# Patient Record
Sex: MALE | Race: WHITE | Employment: FULL TIME | URBAN - METROPOLITAN AREA
[De-identification: names, ages, dates, MRNs, and addresses within clinical notes are randomized per-mention and may not be internally consistent; named-entity substitution may affect disease eponyms.]

---

## 2020-09-18 ENCOUNTER — HOSPITAL ENCOUNTER (OUTPATIENT)
Facility: CLINIC | Age: 50
Discharge: HOME | End: 2020-09-20
Payer: COMMERCIAL

## 2020-09-18 ENCOUNTER — HOSPITAL ENCOUNTER (OUTPATIENT)
Dept: GENERAL RADIOLOGY | Facility: CLINIC | Age: 50
Discharge: HOME | End: 2020-09-20
Payer: COMMERCIAL

## 2020-09-18 ENCOUNTER — HOSPITAL ENCOUNTER (OUTPATIENT)
Facility: CLINIC | Age: 50
Discharge: HOME | End: 2020-09-18
Payer: COMMERCIAL

## 2020-09-18 LAB — SARS-COV-2 ANTIBODY, TOTAL: NEGATIVE

## 2020-09-18 PROCEDURE — 86769 SARS-COV-2 COVID-19 ANTIBODY: CPT

## 2020-09-18 PROCEDURE — 73030 X-RAY EXAM OF SHOULDER: CPT

## 2020-09-18 PROCEDURE — 36415 COLL VENOUS BLD VENIPUNCTURE: CPT

## 2021-03-05 DIAGNOSIS — M25.511 RIGHT SHOULDER PAIN, UNSPECIFIED CHRONICITY: Primary | ICD-10-CM

## 2021-03-08 ENCOUNTER — TELEPHONE (OUTPATIENT)
Dept: ORTHOPEDIC SURGERY | Age: 51
End: 2021-03-08

## 2021-03-08 NOTE — TELEPHONE ENCOUNTER
Called patient and left voicemail stating his 1:00 pm appointment today may be pushed back, due to a surgery.  Patient may arrive at 1:30, or be advised he'll be waiting if he arrives at 1 pm.

## 2021-03-11 ENCOUNTER — TELEPHONE (OUTPATIENT)
Dept: ORTHOPEDIC SURGERY | Age: 51
End: 2021-03-11

## 2021-03-11 NOTE — TELEPHONE ENCOUNTER
Spoke with patient and informed him that I did discuss his situation with Dr. Jorge A Vo since the patient was never seen on 3/8 by Dr. Jorge A Vo (patient left office before Dr. Jorge A Vo returned to the office following a sx at [facility]). Apologized for the inconvenience of coming to the office and not being seen by provider. Informed patient that his XR appeared normal with the exception of mild arthritis at his [facility] jt. Patient is rescheduled to see Dr. Jorge A Vo on 3/15. Patient is thinking that a cortisone inject may be beneficial. I told him that Dr. Jorge A Vo does intra-articular injections if he believes them to appropriate for a patient's condition.

## 2021-03-15 ENCOUNTER — OFFICE VISIT (OUTPATIENT)
Dept: ORTHOPEDIC SURGERY | Age: 51
End: 2021-03-15
Payer: COMMERCIAL

## 2021-03-15 DIAGNOSIS — M75.41 ROTATOR CUFF IMPINGEMENT SYNDROME, RIGHT: Primary | ICD-10-CM

## 2021-03-15 PROCEDURE — 99203 OFFICE O/P NEW LOW 30 MIN: CPT | Performed by: ORTHOPAEDIC SURGERY

## 2021-03-15 RX ORDER — DICLOFENAC SODIUM 75 MG/1
75 TABLET, DELAYED RELEASE ORAL 2 TIMES DAILY
Qty: 28 TABLET | Refills: 0 | Status: SHIPPED | OUTPATIENT
Start: 2021-03-15 | End: 2021-03-29

## 2021-03-15 NOTE — LETTER
3/15/2021    Jermainejemal Marie, [address] [facility] [address] [place],  [facility] [place]    RE: [place]    Dear Dr. Nelson Ronquillo,    Thank you for allowing me to participate in the care of Mr. Dorethia Hammans. I had the opportunity to evaluate the patient on 3/15/2021. Attached you will find my evaluation and recommendations. Thanks again for the confidence you have expressed in me by allowing my participation in the care of your patient. I will keep you apprised of further developments in the patients treatment course as it progresses. If I can be of further assistance in any fashion, please feel free to contact me at your convenience.     Sincerely,        Mitesh Clark  Shoulder and Elbow Surgery

## 2021-03-15 NOTE — PROGRESS NOTES
Orthopedic Shoulder Encounter Note     Chief complaint: Right shoulder pain    HPI: Aaron Nixon is a 46 y.o.  right-hand dominant male who presents for evaluation of his right shoulder. He indicates that he has been having issues for about 9 months now. He first noticed it when he stopped working out at [employer] as a result of the coronavirus pandemic. He developed some shoulder pain and discomfort that slowly but progressively got worse so much so that he finally saw his PCP who initially placed him on prescription strength ibuprofen but then subsequently gave him a cortisone injection about a month ago. He states that the injection has helped but he still has this persistent deep pain in the shoulder. It is primarily bothersome with use and daily activities such as putting on his coat. He does have positional nighttime pain as well. He describes having associated weakness but denies any stiffness. Previous treatment:    NSAIDs: Advil    Physical Therapy: No    Injections: Right shoulder cortisone injection otherwise specified administered about a month ago by his PCP    Surgeries: None    Review of Systems:     Constitution: no fever or chills   Pain level: 5/10  Musculoskeletal: As noted in the HPI   Neurologic: no neurologic symptoms    Past Medical History  Maria Dolores Crisostomo  has no past medical history on file. Past Surgical History  Maria Dolores Crisostomo  has no past surgical history on file. Current Medications  Current Outpatient Medications   Medication Sig Dispense Refill    empagliflozin (JARDIANCE) 25 MG tablet 1 tablet      metFORMIN HCl  MG/5ML SRER TAKE 2 TABLETS BY MOUTH TWO TIMES A DAY      Omega-3 Fatty Acids (OMEGA-3 FISH OIL) 1000 MG CAPS 1 capsule      aspirin 81 MG chewable tablet 1 tablet       No current facility-administered medications for this visit. Allergies  Allergies have been reviewed. Maria Dolores Crisostomo has No Known Allergies. Social History  Maria Dolores Crisostomo  reports that he has been smoking cigars. Ernestine Campos is a 46 y.o. old male with right shoulder pain and weakness. Clinically his findings are concerning for the presence of a full-thickness rotator cuff tear. We also discussed other possible etiologies such as rotator cuff tendinitis. At this time I did recommend proceeding with conservative management. As noted above he has received a cortisone injection to the shoulder just about a month ago. Consequently he was placed on a 2-week course of Voltaren a prescription of which was electronically sent to his pharmacy. Also recommended physical therapy and he states that his PCP has provided a prescription. He was encouraged to get set up for this. I like to see him back for reevaluation in 6 weeks. Should he fail to respond to this treatment I would recommend proceeding with an MRI study of his shoulder to rule out the presence of a full-thickness rotator cuff tear. We discussed the possibility of surgical intervention should he fail conservative management and he have a full-thickness tear. He would like to hold off on any surgical intervention if indicated and warranted until after his golf season this summer.         NA = Not assessed  RTC = Rotator cuff  RCT = Rotator cuff tear  ER = External rotation  IR = Internal rotation  AC = Acromioclavicular  GH = Glenohumeral  n = No  y = Yes

## 2021-04-23 ENCOUNTER — TELEPHONE (OUTPATIENT)
Dept: ORTHOPEDIC SURGERY | Age: 51
End: 2021-04-23

## 2021-04-23 DIAGNOSIS — M75.41 ROTATOR CUFF IMPINGEMENT SYNDROME, RIGHT: Primary | ICD-10-CM

## 2021-04-23 NOTE — TELEPHONE ENCOUNTER
Spoke with patient and informed him that Dr. Nathan Polk recommended a MRI of rt shld if patient fails conservative tx. Appt for 4/26 was canceled. Order for MRI dropped and patient was provided with phone number to schedule MRI at either [facility] or [facility]. Instructed the patient to follow up after MRI completion.

## 2021-04-29 ENCOUNTER — HOSPITAL ENCOUNTER (OUTPATIENT)
Dept: GENERAL RADIOLOGY | Age: 51
Discharge: HOME | End: 2021-05-01
Payer: COMMERCIAL

## 2021-04-29 ENCOUNTER — HOSPITAL ENCOUNTER (OUTPATIENT)
Dept: MRI IMAGING | Age: 51
Discharge: HOME | End: 2021-05-01
Payer: COMMERCIAL

## 2021-04-29 DIAGNOSIS — M75.41 ROTATOR CUFF IMPINGEMENT SYNDROME, RIGHT: ICD-10-CM

## 2021-04-29 DIAGNOSIS — T15.90XA FOREIGN BODY IN EYE, UNSPECIFIED LATERALITY, INITIAL ENCOUNTER: ICD-10-CM

## 2021-04-29 PROCEDURE — 70030 X-RAY EYE FOR FOREIGN BODY: CPT

## 2021-04-29 PROCEDURE — 73221 MRI JOINT UPR EXTREM W/O DYE: CPT

## 2021-05-06 ENCOUNTER — OFFICE VISIT (OUTPATIENT)
Dept: ORTHOPEDIC SURGERY | Age: 51
End: 2021-05-06
Payer: COMMERCIAL

## 2021-05-06 VITALS — HEIGHT: 75 IN | BODY MASS INDEX: 35.43 KG/M2 | WEIGHT: 285 LBS

## 2021-05-06 DIAGNOSIS — M75.41 ROTATOR CUFF IMPINGEMENT SYNDROME, RIGHT: Primary | ICD-10-CM

## 2021-05-06 PROCEDURE — 99212 OFFICE O/P EST SF 10 MIN: CPT | Performed by: ORTHOPAEDIC SURGERY

## 2021-05-06 RX ORDER — ATORVASTATIN CALCIUM 40 MG/1
TABLET, FILM COATED ORAL
COMMUNITY
Start: 2021-03-01

## 2021-05-14 ENCOUNTER — TELEPHONE (OUTPATIENT)
Dept: ORTHOPEDIC SURGERY | Age: 51
End: 2021-05-14

## 2021-05-14 NOTE — TELEPHONE ENCOUNTER
PT prescription: Work on right shoulder RTC/Scapular stabilizer strengthening. Posterior capsule stretching. Modalities as needed. Teach home exercise program.   2-3 times a week for 4-6 weeks.

## 2021-05-14 NOTE — TELEPHONE ENCOUNTER
Created letter with Dr. Gold  instructions on Physical Therapy and faxed over to [facility], Attn:  Jose Luis Cote

## 2021-05-14 NOTE — TELEPHONE ENCOUNTER
Pt called and would like to have information faxed to his PT at [facility], they need to know what area of muscle needs to be work on, please fax information to [phone] attention: Susie Reynaga, thank you

## 2025-01-22 ENCOUNTER — HOSPITAL ENCOUNTER (EMERGENCY)
Facility: CLINIC | Age: 55
Discharge: HOME | End: 2025-01-22
Attending: EMERGENCY MEDICINE
Payer: COMMERCIAL

## 2025-01-22 VITALS
SYSTOLIC BLOOD PRESSURE: 167 MMHG | TEMPERATURE: 97.9 F | HEIGHT: 76 IN | RESPIRATION RATE: 16 BRPM | HEART RATE: 77 BPM | OXYGEN SATURATION: 94 % | WEIGHT: 280 LBS | DIASTOLIC BLOOD PRESSURE: 76 MMHG | BODY MASS INDEX: 34.1 KG/M2

## 2025-01-22 DIAGNOSIS — R19.7 NAUSEA VOMITING AND DIARRHEA: Primary | ICD-10-CM

## 2025-01-22 DIAGNOSIS — R11.2 NAUSEA VOMITING AND DIARRHEA: Primary | ICD-10-CM

## 2025-01-22 LAB
ALBUMIN SERPL-MCNC: 4.5 G/DL (ref 3.5–5.2)
ALBUMIN/GLOB SERPL: 1.5 {RATIO}
ALP SERPL-CCNC: 78 U/L (ref 40–129)
ALT SERPL-CCNC: 42 U/L (ref 10–50)
ANION GAP SERPL CALCULATED.3IONS-SCNC: 17 MMOL/L (ref 9–16)
AST SERPL-CCNC: 26 U/L (ref 10–50)
BASOPHILS # BLD: 0 K/UL (ref 0–0.2)
BASOPHILS NFR BLD: 0 % (ref 0–2)
BILIRUB SERPL-MCNC: 0.7 MG/DL (ref 0–1.2)
BUN SERPL-MCNC: 14 MG/DL (ref 6–20)
CALCIUM SERPL-MCNC: 9.5 MG/DL (ref 8.6–10.4)
CHLORIDE SERPL-SCNC: 99 MMOL/L (ref 98–107)
CO2 SERPL-SCNC: 22 MMOL/L (ref 20–31)
CREAT SERPL-MCNC: 0.9 MG/DL (ref 0.7–1.2)
EOSINOPHIL # BLD: 0 K/UL (ref 0–0.4)
EOSINOPHILS RELATIVE PERCENT: 0 % (ref 1–4)
ERYTHROCYTE [DISTWIDTH] IN BLOOD BY AUTOMATED COUNT: 13.8 % (ref 12.5–15.4)
GFR, ESTIMATED: >90 ML/MIN/1.73M2
GLUCOSE SERPL-MCNC: 207 MG/DL (ref 74–99)
HCT VFR BLD AUTO: 49.3 % (ref 41–53)
HGB BLD-MCNC: 16.8 G/DL (ref 13.5–17.5)
LIPASE SERPL-CCNC: 47 U/L (ref 13–60)
LYMPHOCYTES NFR BLD: 0.5 K/UL (ref 1–4.8)
LYMPHOCYTES RELATIVE PERCENT: 7 % (ref 24–44)
MCH RBC QN AUTO: 29.8 PG (ref 26–34)
MCHC RBC AUTO-ENTMCNC: 34 G/DL (ref 31–37)
MCV RBC AUTO: 87.6 FL (ref 80–100)
MONOCYTES NFR BLD: 0.5 K/UL (ref 0.1–1.2)
MONOCYTES NFR BLD: 7 % (ref 2–11)
NEUTROPHILS NFR BLD: 86 % (ref 36–66)
NEUTS SEG NFR BLD: 6.3 K/UL (ref 1.8–7.7)
PLATELET # BLD AUTO: 201 K/UL (ref 140–450)
PMV BLD AUTO: 7.6 FL (ref 6–12)
POTASSIUM SERPL-SCNC: 3.8 MMOL/L (ref 3.7–5.3)
PROT SERPL-MCNC: 7.6 G/DL (ref 6.6–8.7)
RBC # BLD AUTO: 5.63 M/UL (ref 4.5–5.9)
SODIUM SERPL-SCNC: 138 MMOL/L (ref 136–145)
WBC OTHER # BLD: 7.4 K/UL (ref 3.5–11)

## 2025-01-22 PROCEDURE — 2580000003 HC RX 258: Performed by: EMERGENCY MEDICINE

## 2025-01-22 PROCEDURE — 96375 TX/PRO/DX INJ NEW DRUG ADDON: CPT

## 2025-01-22 PROCEDURE — 36415 COLL VENOUS BLD VENIPUNCTURE: CPT

## 2025-01-22 PROCEDURE — 6360000002 HC RX W HCPCS: Performed by: EMERGENCY MEDICINE

## 2025-01-22 PROCEDURE — 85025 COMPLETE CBC W/AUTO DIFF WBC: CPT

## 2025-01-22 PROCEDURE — 99284 EMERGENCY DEPT VISIT MOD MDM: CPT

## 2025-01-22 PROCEDURE — 80053 COMPREHEN METABOLIC PANEL: CPT

## 2025-01-22 PROCEDURE — 96361 HYDRATE IV INFUSION ADD-ON: CPT

## 2025-01-22 PROCEDURE — 83690 ASSAY OF LIPASE: CPT

## 2025-01-22 PROCEDURE — 96374 THER/PROPH/DIAG INJ IV PUSH: CPT

## 2025-01-22 RX ORDER — ONDANSETRON 4 MG/1
4 TABLET, ORALLY DISINTEGRATING ORAL EVERY 8 HOURS PRN
Qty: 10 TABLET | Refills: 0 | Status: SHIPPED | OUTPATIENT
Start: 2025-01-22

## 2025-01-22 RX ORDER — KETOROLAC TROMETHAMINE 30 MG/ML
30 INJECTION, SOLUTION INTRAMUSCULAR; INTRAVENOUS ONCE
Status: COMPLETED | OUTPATIENT
Start: 2025-01-22 | End: 2025-01-22

## 2025-01-22 RX ORDER — ONDANSETRON 2 MG/ML
4 INJECTION INTRAMUSCULAR; INTRAVENOUS ONCE
Status: COMPLETED | OUTPATIENT
Start: 2025-01-22 | End: 2025-01-22

## 2025-01-22 RX ORDER — DIPHENHYDRAMINE HYDROCHLORIDE 50 MG/ML
50 INJECTION INTRAMUSCULAR; INTRAVENOUS ONCE
Status: COMPLETED | OUTPATIENT
Start: 2025-01-22 | End: 2025-01-22

## 2025-01-22 RX ORDER — 0.9 % SODIUM CHLORIDE 0.9 %
1000 INTRAVENOUS SOLUTION INTRAVENOUS ONCE
Status: COMPLETED | OUTPATIENT
Start: 2025-01-22 | End: 2025-01-22

## 2025-01-22 RX ORDER — PROCHLORPERAZINE EDISYLATE 5 MG/ML
10 INJECTION INTRAMUSCULAR; INTRAVENOUS ONCE
Status: COMPLETED | OUTPATIENT
Start: 2025-01-22 | End: 2025-01-22

## 2025-01-22 RX ADMIN — PROCHLORPERAZINE EDISYLATE 10 MG: 5 INJECTION INTRAMUSCULAR; INTRAVENOUS at 20:18

## 2025-01-22 RX ADMIN — ONDANSETRON 4 MG: 2 INJECTION, SOLUTION INTRAMUSCULAR; INTRAVENOUS at 20:15

## 2025-01-22 RX ADMIN — KETOROLAC TROMETHAMINE 30 MG: 30 INJECTION, SOLUTION INTRAMUSCULAR at 20:15

## 2025-01-22 RX ADMIN — SODIUM CHLORIDE 1000 ML: 9 INJECTION, SOLUTION INTRAVENOUS at 20:12

## 2025-01-22 RX ADMIN — DIPHENHYDRAMINE HYDROCHLORIDE 50 MG: 50 INJECTION INTRAMUSCULAR; INTRAVENOUS at 20:16

## 2025-01-22 ASSESSMENT — PAIN - FUNCTIONAL ASSESSMENT: PAIN_FUNCTIONAL_ASSESSMENT: 0-10

## 2025-01-22 ASSESSMENT — PAIN SCALES - GENERAL
PAINLEVEL_OUTOF10: 5
PAINLEVEL_OUTOF10: 5

## 2025-01-22 ASSESSMENT — PAIN DESCRIPTION - LOCATION: LOCATION: HEAD

## 2025-01-23 NOTE — ED PROVIDER NOTES
[facility]  [address] [facility] [address]  Phone: [phone]      Pt Name: Wilver Simms  MRN:[id number]  Birthdate 1970  Date of evaluation: 1/22/2025      CHIEF COMPLAINT       Chief Complaint   Patient presents with    Vomiting    Diarrhea    Headache       HISTORY OF PRESENT ILLNESS   Wilver Simms is a 54 y.o. male who presents for evaluation of nausea, vomiting and diarrhea.  The patient reports that starting last night he developed a nonproductive cough, sleeplessness, and generalized myalgias.  Throughout the day he has had gradual onset, constant, progressive, nausea, vomiting, abdominal cramping, diarrhea, subjective fever, chills, headache, malaise, fatigue, and myalgias.  He has had 3 episodes of nonbilious nonbloody emesis and 2 episodes of watery nonbloody diarrhea.  He took DayQuil without improvement.  He has not tried any other medications.  The patient does not list any other provoking or palliating factors.  He denies any sick contacts.  The patient denies vision changes, neck pain, neck stiffness, back pain, chest pain, shortness of breath, urinary symptoms, hematochezia, melena, focal weakness, numbness, tingling, dizziness, lightheadedness, syncope, recent injury or illness.    REVIEW OF SYSTEMS     Positive: Cough, sleeplessness, nausea, vomiting, abdominal cramping, diarrhea, subjective fever, chills, headache, malaise, fatigue, and myalgias  Ten point review of systems was reviewed and is negative unless otherwise noted in the HPI    PAST MEDICAL HISTORY    has no past medical history on file.  Patient denies    SURGICAL HISTORY      has no past surgical history on file.  Patient denies    CURRENT MEDICATIONS       Discharge Medication List as of 1/22/2025  8:51 PM        CONTINUE these medications which have NOT CHANGED    Details   atorvastatin (LIPITOR) 40 MG tablet TAKE 1 TABLET BY MOUTH EVERY DAYHistorical Med      diclofenac (VOLTAREN) 75 MG EC tablet Take 1